# Patient Record
Sex: FEMALE | Race: WHITE | NOT HISPANIC OR LATINO | Employment: OTHER | URBAN - METROPOLITAN AREA
[De-identification: names, ages, dates, MRNs, and addresses within clinical notes are randomized per-mention and may not be internally consistent; named-entity substitution may affect disease eponyms.]

---

## 2024-06-24 ENCOUNTER — OFFICE VISIT (OUTPATIENT)
Dept: URGENT CARE | Facility: URGENT CARE | Age: 74
End: 2024-06-24
Payer: COMMERCIAL

## 2024-06-24 VITALS
SYSTOLIC BLOOD PRESSURE: 122 MMHG | DIASTOLIC BLOOD PRESSURE: 60 MMHG | OXYGEN SATURATION: 95 % | HEART RATE: 80 BPM | RESPIRATION RATE: 16 BRPM | TEMPERATURE: 96.8 F

## 2024-06-24 DIAGNOSIS — R22.0 TONGUE SWELLING: Primary | ICD-10-CM

## 2024-06-24 DIAGNOSIS — K14.0 TONGUE INFLAMMATION: ICD-10-CM

## 2024-06-24 PROCEDURE — 99203 OFFICE O/P NEW LOW 30 MIN: CPT | Performed by: PHYSICIAN ASSISTANT

## 2024-06-24 RX ORDER — CIMETIDINE 300 MG
1 TABLET ORAL 2 TIMES DAILY
COMMUNITY
Start: 2024-06-10

## 2024-06-24 RX ORDER — PANTOPRAZOLE SODIUM 40 MG/1
40 TABLET, DELAYED RELEASE ORAL DAILY
COMMUNITY
Start: 2024-06-10

## 2024-06-24 RX ORDER — FLUTICASONE FUROATE, UMECLIDINIUM BROMIDE AND VILANTEROL TRIFENATATE 100; 62.5; 25 UG/1; UG/1; UG/1
POWDER RESPIRATORY (INHALATION)
COMMUNITY
Start: 2022-09-28

## 2024-06-24 RX ORDER — TRIAMCINOLONE ACETONIDE 0.1 %
PASTE (GRAM) DENTAL 2 TIMES DAILY
Qty: 10 G | Refills: 0 | Status: SHIPPED | OUTPATIENT
Start: 2024-06-24

## 2024-06-24 RX ORDER — OMEPRAZOLE 40 MG/1
1 CAPSULE, DELAYED RELEASE ORAL DAILY
COMMUNITY

## 2024-06-24 RX ORDER — FAMOTIDINE 20 MG/1
TABLET, FILM COATED ORAL
COMMUNITY
Start: 2024-01-09

## 2024-06-24 RX ORDER — SACCHAROMYCES BOULARDII 250 MG
CAPSULE ORAL
COMMUNITY

## 2024-06-24 RX ORDER — HYDROXYZINE PAMOATE 25 MG/1
CAPSULE ORAL
COMMUNITY
Start: 2022-08-17

## 2024-06-24 RX ORDER — CETIRIZINE HYDROCHLORIDE 10 MG/1
10 TABLET ORAL DAILY
Qty: 30 TABLET | Refills: 0 | Status: SHIPPED | OUTPATIENT
Start: 2024-06-24

## 2024-06-24 RX ORDER — MULTIVITAMIN
1 TABLET ORAL DAILY
COMMUNITY

## 2024-06-24 NOTE — PROGRESS NOTES
Assessment & Plan     Tongue swelling    Patient has localized swelling and inflammation left side tongue  Due to swelling and inflammation will start medications:    - cetirizine (ZYRTEC) 10 MG tablet  Dispense: 30 tablet; Refill: 0  - triamcinolone (KENALOG) 0.1 % paste  Dispense: 10 g; Refill: 0  - Adult ENT  Referral    Tongue inflammation    Due to 3 days of having tongue inflammation  Will start medications for tongue swelling or inflammation  Referral to ENT for tongue    - cetirizine (ZYRTEC) 10 MG tablet  Dispense: 30 tablet; Refill: 0  - triamcinolone (KENALOG) 0.1 % paste  Dispense: 10 g; Refill: 0  - Adult ENT  Referral       No follow-ups on file.    Jone Rg, [facility], PA-C  [facility] URGENT CARE [facility]    Anastasiya Ravi is a 73 year old female who presents to clinic today for the following health issues:  Chief Complaint   Patient presents with    Oral Swelling     L side of tongue feel rigid and swollen        HPI  Review of Systems  Constitutional, HEENT, cardiovascular, pulmonary, gi and gu systems are negative, except as otherwise noted.      Objective    /60   Pulse 80   Temp 96.8  F (36  C) (Tympanic)   Resp 16   SpO2 95%   Physical Exam   GENERAL: alert and no distress  EYES: Eyes grossly normal to inspection, PERRL and conjunctivae and sclerae normal  HENT: pos for left side tongue inflammation and swelling  NECK: no adenopathy, no asymmetry, masses, or scars  SKIN: pos for left side swelling of tongue  NEURO: Normal strength and tone, mentation intact and speech normal  PSYCH: mentation appears normal, affect normal/bright      No results found for any visits on 06/24/24.

## 2024-06-26 ENCOUNTER — HOSPITAL ENCOUNTER (EMERGENCY)
Facility: CLINIC | Age: 74
Discharge: HOME | End: 2024-06-26
Attending: EMERGENCY MEDICINE | Admitting: EMERGENCY MEDICINE
Payer: COMMERCIAL

## 2024-06-26 ENCOUNTER — NURSE TRIAGE (OUTPATIENT)
Dept: NURSING | Facility: CLINIC | Age: 74
End: 2024-06-26

## 2024-06-26 VITALS
HEIGHT: 63 IN | WEIGHT: 145 LBS | TEMPERATURE: 98.1 F | SYSTOLIC BLOOD PRESSURE: 117 MMHG | OXYGEN SATURATION: 99 % | BODY MASS INDEX: 25.69 KG/M2 | DIASTOLIC BLOOD PRESSURE: 70 MMHG | HEART RATE: 69 BPM | RESPIRATION RATE: 19 BRPM

## 2024-06-26 DIAGNOSIS — K21.9 GASTROESOPHAGEAL REFLUX DISEASE WITHOUT ESOPHAGITIS: ICD-10-CM

## 2024-06-26 LAB
ALBUMIN SERPL BCG-MCNC: 4.1 G/DL (ref 3.5–5.2)
ALP SERPL-CCNC: 77 U/L (ref 40–150)
ALT SERPL W P-5'-P-CCNC: 13 U/L (ref 0–50)
ANION GAP SERPL CALCULATED.3IONS-SCNC: 9 MMOL/L (ref 7–15)
AST SERPL W P-5'-P-CCNC: 26 U/L (ref 0–45)
ATRIAL RATE - MUSE: 74 BPM
BASOPHILS # BLD AUTO: 0 10E3/UL (ref 0–0.2)
BASOPHILS NFR BLD AUTO: 0 %
BILIRUB SERPL-MCNC: 0.4 MG/DL
BUN SERPL-MCNC: 21 MG/DL (ref 8–23)
CALCIUM SERPL-MCNC: 9.3 MG/DL (ref 8.8–10.2)
CHLORIDE SERPL-SCNC: 104 MMOL/L (ref 98–107)
CREAT SERPL-MCNC: 0.69 MG/DL (ref 0.51–0.95)
DEPRECATED HCO3 PLAS-SCNC: 26 MMOL/L (ref 22–29)
DIASTOLIC BLOOD PRESSURE - MUSE: NORMAL MMHG
EGFRCR SERPLBLD CKD-EPI 2021: >90 ML/MIN/1.73M2
EOSINOPHIL # BLD AUTO: 0.1 10E3/UL (ref 0–0.7)
EOSINOPHIL NFR BLD AUTO: 4 %
ERYTHROCYTE [DISTWIDTH] IN BLOOD BY AUTOMATED COUNT: 13.3 % (ref 10–15)
GLUCOSE SERPL-MCNC: 111 MG/DL (ref 70–99)
HCT VFR BLD AUTO: 38.7 % (ref 35–47)
HGB BLD-MCNC: 12.8 G/DL (ref 11.7–15.7)
IMM GRANULOCYTES # BLD: 0 10E3/UL
IMM GRANULOCYTES NFR BLD: 0 %
INTERPRETATION ECG - MUSE: NORMAL
LYMPHOCYTES # BLD AUTO: 1.2 10E3/UL (ref 0.8–5.3)
LYMPHOCYTES NFR BLD AUTO: 43 %
MCH RBC QN AUTO: 30.9 PG (ref 26.5–33)
MCHC RBC AUTO-ENTMCNC: 33.1 G/DL (ref 31.5–36.5)
MCV RBC AUTO: 94 FL (ref 78–100)
MONOCYTES # BLD AUTO: 0.4 10E3/UL (ref 0–1.3)
MONOCYTES NFR BLD AUTO: 14 %
NEUTROPHILS # BLD AUTO: 1 10E3/UL (ref 1.6–8.3)
NEUTROPHILS NFR BLD AUTO: 39 %
NRBC # BLD AUTO: 0 10E3/UL
NRBC BLD AUTO-RTO: 0 /100
P AXIS - MUSE: 47 DEGREES
PLATELET # BLD AUTO: 231 10E3/UL (ref 150–450)
POTASSIUM SERPL-SCNC: 4 MMOL/L (ref 3.4–5.3)
PR INTERVAL - MUSE: 160 MS
PROT SERPL-MCNC: 7.2 G/DL (ref 6.4–8.3)
QRS DURATION - MUSE: 66 MS
QT - MUSE: 370 MS
QTC - MUSE: 410 MS
R AXIS - MUSE: 56 DEGREES
RBC # BLD AUTO: 4.14 10E6/UL (ref 3.8–5.2)
SODIUM SERPL-SCNC: 139 MMOL/L (ref 135–145)
SYSTOLIC BLOOD PRESSURE - MUSE: NORMAL MMHG
T AXIS - MUSE: -6 DEGREES
TROPONIN T SERPL HS-MCNC: <6 NG/L
VENTRICULAR RATE- MUSE: 74 BPM
WBC # BLD AUTO: 2.7 10E3/UL (ref 4–11)

## 2024-06-26 PROCEDURE — 93010 ELECTROCARDIOGRAM REPORT: CPT | Performed by: EMERGENCY MEDICINE

## 2024-06-26 PROCEDURE — 99284 EMERGENCY DEPT VISIT MOD MDM: CPT | Performed by: EMERGENCY MEDICINE

## 2024-06-26 PROCEDURE — 36415 COLL VENOUS BLD VENIPUNCTURE: CPT | Performed by: EMERGENCY MEDICINE

## 2024-06-26 PROCEDURE — 84484 ASSAY OF TROPONIN QUANT: CPT | Performed by: EMERGENCY MEDICINE

## 2024-06-26 PROCEDURE — 80053 COMPREHEN METABOLIC PANEL: CPT | Performed by: EMERGENCY MEDICINE

## 2024-06-26 PROCEDURE — 250N000013 HC RX MED GY IP 250 OP 250 PS 637: Performed by: EMERGENCY MEDICINE

## 2024-06-26 PROCEDURE — 85025 COMPLETE CBC W/AUTO DIFF WBC: CPT | Performed by: EMERGENCY MEDICINE

## 2024-06-26 PROCEDURE — 93005 ELECTROCARDIOGRAM TRACING: CPT | Performed by: EMERGENCY MEDICINE

## 2024-06-26 PROCEDURE — 250N000009 HC RX 250: Performed by: EMERGENCY MEDICINE

## 2024-06-26 RX ORDER — LIDOCAINE HYDROCHLORIDE 20 MG/ML
10 SOLUTION OROPHARYNGEAL ONCE
Status: COMPLETED | OUTPATIENT
Start: 2024-06-26 | End: 2024-06-26

## 2024-06-26 RX ORDER — MAGNESIUM HYDROXIDE/ALUMINUM HYDROXICE/SIMETHICONE 120; 1200; 1200 MG/30ML; MG/30ML; MG/30ML
15 SUSPENSION ORAL ONCE
Status: COMPLETED | OUTPATIENT
Start: 2024-06-26 | End: 2024-06-26

## 2024-06-26 RX ADMIN — LIDOCAINE HYDROCHLORIDE 10 ML: 20 SOLUTION OROPHARYNGEAL at 07:32

## 2024-06-26 RX ADMIN — ALUMINUM HYDROXIDE, MAGNESIUM HYDROXIDE, AND SIMETHICONE 15 ML: 1200; 120; 1200 SUSPENSION ORAL at 07:32

## 2024-06-26 ASSESSMENT — ACTIVITIES OF DAILY LIVING (ADL)
ADLS_ACUITY_SCORE: 35

## 2024-06-26 ASSESSMENT — COLUMBIA-SUICIDE SEVERITY RATING SCALE - C-SSRS
6. HAVE YOU EVER DONE ANYTHING, STARTED TO DO ANYTHING, OR PREPARED TO DO ANYTHING TO END YOUR LIFE?: NO
2. HAVE YOU ACTUALLY HAD ANY THOUGHTS OF KILLING YOURSELF IN THE PAST MONTH?: NO
1. IN THE PAST MONTH, HAVE YOU WISHED YOU WERE DEAD OR WISHED YOU COULD GO TO SLEEP AND NOT WAKE UP?: NO

## 2024-06-26 NOTE — ED PROVIDER NOTES
ED PROVIDER NOTE  June 26, 2024  History     Chief Complaint   Patient presents with    Shortness of Breath    Heartburn     HPI  Breonna Chapman is a 73 year old female who arrived today to the emergency department from [place] for medical evaluation emergency department for ongoing gastroesophageal reflux.  She states he was diagnosed with this approximately a year ago.  She has been intermittently in the emergency department.  She reports frustration that she has intolerant to PPIs and H2 blockers.  She has never had a gastroenterologist evaluate her.  She states she was referred to a [facility] but was dissatisfied.  The patient states she typically travels and when she finds a good healthcare provider in the region she will fly back to that region and thus is in [place] secondary to good health care.  She has had recent evaluation for cardiac workup in the emergency room including troponins and EKGs demonstrate no sign of obvious cardiac disease.  She reports no known CAD.  She reports primary reflux at night described as burning sensation with taste change.  No shortness of breath, fever, chills difficulty breathing or difficulty swallowing.  Patient reports he is presently here for a GI cocktail with request of GI follow-up.      Past Medical History  No past medical history on file.  No past surgical history on file.  calcium carbonate-vitamin D (OSCAL) 500-5 MG-MCG tablet  cetirizine (ZYRTEC) 10 MG tablet  cholecalciferol (VITAMIN D3) 25 mcg (1000 units) capsule  cimetidine (TAGAMET) 300 MG tablet  famotidine (PEPCID) 20 MG tablet  Fluticasone-Umeclidin-Vilant (TRELEGY ELLIPTA) 100-62.5-25 MCG/ACT oral inhaler  hydrOXYzine sunny (VISTARIL) 25 MG capsule  Multiple Vitamin (DAILY VITES) TABS  omeprazole (PRILOSEC) 40 MG DR capsule  pantoprazole (PROTONIX) 40 MG EC tablet  Probiotic Product (PROBIOTIC-10) CHEW  saccharomyces boulardii (FLORASTOR) 250 MG capsule  triamcinolone (KENALOG) 0.1 % paste      Allergies    Allergen Reactions    Cephalexin Hives and Rash     Other Reaction(s): Not available, Rash with itching (itching rash, pruritus)    Tolerates Ancef    Other Reaction(s): Not available, Rash with itching (itching rash, pruritus)      Tolerates Ancef      Tolerates Ancef Tolerates Ancef    Tolerates Ancef   Tolerates Ancef    Ciprofloxacin Nausea and Vomiting and Palpitations     Other Reaction(s): *Unknown    Heart palpitations    Doesn't think she is allergic    Other Reaction(s): Not available      Heart palpitations    Fluconazole Anaphylaxis, Itching, Rash, Shortness Of Breath, Swelling and Unknown     Other Reaction(s): Fever, Not available, Rash (Non-Urticarial Bumps), Rash with itching (itching rash, pruritus)    Other reaction(s): Rash (Non-Urticarial Bumps)   Other reaction(s): Fever    Other reaction(s): Fever    Other Reaction(s): Not available, Rash (Non-Urticarial Bumps), Rash with itching (itching rash, pruritus), Unknown, Unknown      Other reaction(s): Rash (Non-Urticarial Bumps) Other reaction(s): Fever Other reaction(s): Fever      Other reaction(s): Rash (Non-Urticarial Bumps) Other reaction(s): Fever      Other reaction(s): Fever    Other reaction(s): Rash (Non-Urticarial Bumps)   Other reaction(s): Fever   Other reaction(s): Fever    Montelukast Anaphylaxis, Unknown, Other (See Comments), Rash and Shortness Of Breath     Felt like she couldn't breathe    Other Reaction(s): Not available    Omeprazole Dermatitis, Itching and Rash     Other Reaction(s): Rash without itching      Drug-induced lupus, confirmed with dermatology skin biopsy    Drug-induced lupus, confirmed with dermatology skin biopsy    Mupirocin Itching    Penicillins Rash     Other Reaction(s): Rash (Non-Urticarial Bumps), Rash with itching (itching rash, pruritus)    Other reaction(s): Rash (Non-Urticarial Bumps)    Other Reaction(s): Rash (Non-Urticarial Bumps), Rash with itching (itching rash, pruritus)      Other reaction(s):  "Rash (Non-Urticarial Bumps)    Mold      Other Reaction(s): Other (See Comments)    Patient reported    Molds & Smuts      Other Reaction(s): Other (See Comments)      Patient reported    Pneumococcal Polysaccharides Conjugated Vaccine Unknown     Other Reaction(s): Unknown    Sulfasalazine Other (See Comments)     Patient doesn't remember reaction. Patient doesn't remember reaction.      Patient doesn't remember reaction.    Azithromycin Palpitations, Rash and Unknown     Other Reaction(s): Not available, Rash (Non-Urticarial Bumps), Rash with itching (itching rash, pruritus)    \"was taking with flucanozale and think it was mainly fluconazole but unsure if azithromycin\"    Other Reaction(s): Not available, Rash (Non-Urticarial Bumps), Rash with itching (itching rash, pruritus), Unknown      \"was taking with flucanozale and think it was mainly fluconazole but unsure if azithromycin\"    Doxycycline Muscle Pain (Myalgia)    Famotidine Rash    Fluorescein-Proparacaine Rash    Orphenadrine Rash    Sulfa Antibiotics Other (See Comments), Rash and Unknown     Other Reaction(s): Unknown     Family History  No family history on file.  Social History          A medically appropriate review of systems was performed with pertinent positives and negatives noted in the HPI, and all other systems negative.      Physical Exam   BP: 117/70  Pulse: 69  Temp: 98.1  F (36.7  C)  Resp: 19  Height: 160 cm (5' 3\")  Weight: 65.8 kg (145 lb)  SpO2: 99 %      Physical Exam  Vitals and nursing note reviewed.   Constitutional:       General: She is not in acute distress.     Appearance: Normal appearance. She is not ill-appearing, toxic-appearing or diaphoretic.   HENT:      Head: Normocephalic and atraumatic.      Right Ear: External ear normal.      Left Ear: External ear normal.      Nose: Nose normal. No congestion.      Mouth/Throat:      Mouth: Mucous membranes are moist.      Pharynx: Oropharynx is clear. No oropharyngeal exudate. "   Eyes:      Extraocular Movements: Extraocular movements intact.      Conjunctiva/sclera: Conjunctivae normal.      Pupils: Pupils are equal, round, and reactive to light.   Cardiovascular:      Rate and Rhythm: Normal rate.      Pulses: Normal pulses.      Heart sounds: Normal heart sounds. No murmur heard.     No friction rub.   Pulmonary:      Effort: Pulmonary effort is normal. No respiratory distress.      Breath sounds: No stridor. No wheezing or rales.   Abdominal:      General: Abdomen is flat.   Musculoskeletal:      Cervical back: Normal range of motion.   Skin:     General: Skin is warm.      Capillary Refill: Capillary refill takes less than 2 seconds.      Coloration: Skin is not pale.      Findings: No bruising or erythema.   Neurological:      General: No focal deficit present.      Mental Status: She is alert.      Cranial Nerves: No cranial nerve deficit.      Motor: No weakness.   Psychiatric:         Mood and Affect: Mood normal.         Behavior: Behavior normal.         ED Course        Procedures         Results for orders placed or performed during the hospital encounter of 06/26/24 (from the past 24 hour(s))   EKG 12 lead   Result Value Ref Range    Systolic Blood Pressure  mmHg    Diastolic Blood Pressure  mmHg    Ventricular Rate 74 BPM    Atrial Rate 74 BPM    MN Interval 160 ms    QRS Duration 66 ms     ms    QTc 410 ms    P Axis 47 degrees    R AXIS 56 degrees    T Axis -6 degrees    Interpretation ECG       Sinus rhythm  Low voltage QRS  Nonspecific T wave abnormality  Abnormal ECG     CBC with platelets differential    Narrative    The following orders were created for panel order CBC with platelets differential.  Procedure                               Abnormality         Status                     ---------                               -----------         ------                     CBC with platelets and d...[[id number]]  Abnormal            Final result                 Please  view results for these tests on the individual orders.   Troponin T, High Sensitivity   Result Value Ref Range    Troponin T, High Sensitivity <6 <=14 ng/L   Comprehensive metabolic panel   Result Value Ref Range    Sodium 139 135 - 145 mmol/L    Potassium 4.0 3.4 - 5.3 mmol/L    Carbon Dioxide (CO2) 26 22 - 29 mmol/L    Anion Gap 9 7 - 15 mmol/L    Urea Nitrogen 21.0 8.0 - 23.0 mg/dL    Creatinine 0.69 0.51 - 0.95 mg/dL    GFR Estimate >90 >60 mL/min/1.73m2    Calcium 9.3 8.8 - 10.2 mg/dL    Chloride 104 98 - 107 mmol/L    Glucose 111 (H) 70 - 99 mg/dL    Alkaline Phosphatase 77 40 - 150 U/L    AST 26 0 - 45 U/L    ALT 13 0 - 50 U/L    Protein Total 7.2 6.4 - 8.3 g/dL    Albumin 4.1 3.5 - 5.2 g/dL    Bilirubin Total 0.4 <=1.2 mg/dL   CBC with platelets and differential   Result Value Ref Range    WBC Count 2.7 (L) 4.0 - 11.0 10e3/uL    RBC Count 4.14 3.80 - 5.20 10e6/uL    Hemoglobin 12.8 11.7 - 15.7 g/dL    Hematocrit 38.7 35.0 - 47.0 %    MCV 94 78 - 100 fL    MCH 30.9 26.5 - 33.0 pg    MCHC 33.1 31.5 - 36.5 g/dL    RDW 13.3 10.0 - 15.0 %    Platelet Count 231 150 - 450 10e3/uL    % Neutrophils 39 %    % Lymphocytes 43 %    % Monocytes 14 %    % Eosinophils 4 %    % Basophils 0 %    % Immature Granulocytes 0 %    NRBCs per 100 WBC 0 <1 /100    Absolute Neutrophils 1.0 (L) 1.6 - 8.3 10e3/uL    Absolute Lymphocytes 1.2 0.8 - 5.3 10e3/uL    Absolute Monocytes 0.4 0.0 - 1.3 10e3/uL    Absolute Eosinophils 0.1 0.0 - 0.7 10e3/uL    Absolute Basophils 0.0 0.0 - 0.2 10e3/uL    Absolute Immature Granulocytes 0.0 <=0.4 10e3/uL    Absolute NRBCs 0.0 10e3/uL     Medications   alum & mag hydroxide-simethicone (MAALOX) suspension 15 mL (15 mLs Oral $Given 6/26/24 0732)   lidocaine (viscous) (XYLOCAINE) 2 % solution 10 mL (10 mLs Mouth/Throat $Given 6/26/24 0732)             Critical care was not performed.     Medical Decision Making  The patient's presentation was of moderate complexity (an acute complicated injury).    The  patient's evaluation involved:  review of external note(s) from 3+ sources (see separate area of note for details)  review of 3+ test result(s) ordered prior to this encounter (see separate area of note for details)  ordering and/or review of 3+ test(s) in this encounter (see separate area of note for details)    The patient's management necessitated moderate risk (prescription drug management including medications given in the ED).    Assessments & Plan (with Medical Decision Making)     Breonna Chapman is a 73 year old female who arrived today to the emergency department from [place] for medical evaluation emergency department for ongoing gastroesophageal reflux.  Upon arrival patient noted alert.  Presently afebrile and hemodynamically stable.  She is seated upright in a chair and appears to be nontoxic.  She is speaking in full sentence without signs of increased work of breathing.  Low suspicion for primary thoracic disease such as pneumothorax, pneumonia, PE, effusion.  She seems to be talking without difficulty in swallowing.  Low suspicion for large esophageal mass or hiatal hernia warranting CT imaging.  She does not have any hemoptysis or hematemesis.  I do not believe she benefit from stat CT imaging of the chest.  She does describe symptoms consistent with refractory reflux.  She would likely benefit from GI follow-up which I will place electronically.  I did set realistic limitations from emergent management for chronic reflux as well as timing for consideration of gastroenterology follow-up.  I have a low suspicion for CAD, pericarditis, myocarditis, pericardial effusion but did obtain troponins EKG.  EKG in my interpretation demonstrating a sinus rhythm and normal rate.  No ST changes, PVCs or interval abnormality.  I would plan for single troponin with comparison to that performed in [place] if nonelevated hold on further cardiac workup emergently.    Laboratory studies overall reassuring with no  significant leukocytosis, anemia or electrolyte abnormality.  Troponin nonelevated.  I do not believe requires emergent imaging at this time.    I did further discuss patient's history.  She states she has actually been to [facility] and now presenting to [place] for workup of continued reflux.  I did further clarify she was evaluated by gastroenterology at one of the above facilities.  She does confirm that she has not had EGD.  I suspect at some point she would benefit from this however not on an emergent basis.  Again as per above I did set realistic limitations with presenting to emergency department for gastroenterology consultation.  I did offer to connect her virtually which makes mediate an outpatient consultation however she states per [place] law she cannot have a virtual visit electronically unless that provider is in state.  As such I have requested as per patient's request an outpatient follow-up with gastroenterology.  I did discuss that certainly were happy to reevaluate her in the emergency department at any time should she have change, progression or worsening of symptoms.  I have strongly encouraged her to work with a primary care physician to establish ongoing care with a gastroenterologist locally or to work towards scheduling a direct follow-up.  We did discuss complexity including potential other medications, local injection, potential biopsy, indications for EGD as well as some patients potentially requiring surgical intervention for tightening of sphincter.  I did stress that most of these are nonemergent causes and a nondiagnostic form simply cited some potential interventions patients get.    I have reviewed the nursing notes.    I have reviewed the findings, diagnosis, plan and need for follow up with the patient.    New Prescriptions    No medications on file       Final diagnoses:   Gastroesophageal reflux disease without esophagitis       STEVEN WEBB  MD    6/26/2024   [facility] EMERGENCY DEPARTMENT     Gus Farfan MD  06/26/24 0844

## 2024-06-26 NOTE — DISCHARGE INSTRUCTIONS
I was happy to see that you have had some relief after the GI cocktail in emergency department.  Sounds quite frustrating the amount of follow-up and attempt to achieve specialty evaluation the past.  I have requested a follow-up with her gastroenterology service electronically.  I would anticipate a call from the gastroenterology .  As we discussed in person some specialties including gastroenterology can be quite sometime before actual follow-up happens in person.  We did discuss the potential option for virtual visit however as per your understanding of the [place] law this is not viable.  I would continue to work with your primary care physician for assistance with scheduling in advance optimally saving time from presenting to the emergency department.  As we discussed on my end it is difficult to request an emergent follow-up and this is fairly consistent most places across the country.  Should you notice a change, progression or worsening symptoms such as difficulty swallowing, inability to swallow, severe abdominal pain, blood within your stool, high fevers or any other concern please feel free to call or return emergently at any time.

## 2024-06-26 NOTE — CONSULTS
Care Management Discharge Note    Discharge Date:         Discharge Disposition:  Not assessed    Discharge Services:  Not assessed    Discharge DME:  Not assessed    Discharge Transportation:  Not assessed    Private pay costs discussed: Not applicable    Does the patient's insurance plan have a 3 day qualifying hospital stay waiver?  No    PAS Confirmation Code:  N/A  Patient/family educated on Medicare website which has current facility and service quality ratings:  Not assessed    Education Provided on the Discharge Plan:  Yes  Persons Notified of Discharge Plans: N/A  Patient/Family in Agreement with the Plan:      Handoff Referral Completed: No    Additional Information:  Rafaelr received [facility] consult. Pt was discharged to the [facility], but has questions about if she can scheduled OP follow up care here--pt is from [place]. Pt reportedly traveling out of state because she has been unsatisfied with the care in her home state and has had good care here previously.    Rafaelr met with pt in the [facility]. Pt reports she is trying to schedule an endoscope appt but they are booked out until October per pt's report. Pt requested that writer schedule her an appointment, which writer explained he would be unable to do. Rafaelr worked with pt on identifying various health systems she could contact in the area and also recommended [facility] in [place]. Pt also asked about short term housing for seniors. Writer explained there wasn't any low income short term housing for seniors that  was aware of at this time.     No further needs identified at this time. Pt will be discharging from the [facility].    ________________    MICHAEL Woo, [facility]  ED/Observation   [facility]  Phone: [phone]  Fax: [phone]    After hours [employer] and After Hours [employer]  Available from 4:00pm - Midnight

## 2024-06-27 NOTE — TELEPHONE ENCOUNTER
Nurse Triage SBAR    Situation: Calling about EKG test result from ER.     Background:   -Patient calling  -It is okay to call back and leave a detailed message at this number:       Assessment: Pt had ER visit today and is concerned about her EKG result. She is from out of state and visiting locally.She stated that the ER nurse gave her the AVS at the end of her visit and told her that all of her test results were good, but pt is wondering what the EKG interpretation means.  No triage.     Advised to call the nurse line for the ER and was given the phone number. Unsure if kristen will be able to answer her questions.  Otherwise follow up with her pcp or other healthcare provider in clinic.   Reason for Disposition    [1] Follow-up call to recent contact AND [2] information only call, no triage required    Protocols used: Information Only Call - No Triage-A-

## 2024-06-28 VITALS
HEART RATE: 64 BPM | RESPIRATION RATE: 16 BRPM | TEMPERATURE: 98 F | WEIGHT: 145 LBS | SYSTOLIC BLOOD PRESSURE: 147 MMHG | OXYGEN SATURATION: 99 % | DIASTOLIC BLOOD PRESSURE: 56 MMHG | BODY MASS INDEX: 25.69 KG/M2

## 2024-06-28 PROCEDURE — 93005 ELECTROCARDIOGRAM TRACING: CPT

## 2024-06-28 PROCEDURE — 99284 EMERGENCY DEPT VISIT MOD MDM: CPT

## 2024-06-29 ENCOUNTER — HOSPITAL ENCOUNTER (EMERGENCY)
Facility: CLINIC | Age: 74
Discharge: HOME | End: 2024-06-29
Attending: EMERGENCY MEDICINE | Admitting: EMERGENCY MEDICINE
Payer: COMMERCIAL

## 2024-06-29 DIAGNOSIS — R00.2 PALPITATIONS: ICD-10-CM

## 2024-06-29 DIAGNOSIS — M54.2 NECK PAIN: ICD-10-CM

## 2024-06-29 DIAGNOSIS — K21.00 GASTROESOPHAGEAL REFLUX DISEASE WITH ESOPHAGITIS WITHOUT HEMORRHAGE: ICD-10-CM

## 2024-06-29 LAB
ACANTHOCYTES BLD QL SMEAR: NORMAL
ANION GAP SERPL CALCULATED.3IONS-SCNC: 9 MMOL/L (ref 7–15)
ATRIAL RATE - MUSE: 62 BPM
AUER BODIES BLD QL SMEAR: NORMAL
BASO STIPL BLD QL SMEAR: NORMAL
BASOPHILS # BLD AUTO: 0 10E3/UL (ref 0–0.2)
BASOPHILS NFR BLD AUTO: 1 %
BITE CELLS BLD QL SMEAR: NORMAL
BLISTER CELLS BLD QL SMEAR: NORMAL
BUN SERPL-MCNC: 22.1 MG/DL (ref 8–23)
BURR CELLS BLD QL SMEAR: NORMAL
CALCIUM SERPL-MCNC: 9.2 MG/DL (ref 8.8–10.2)
CHLORIDE SERPL-SCNC: 103 MMOL/L (ref 98–107)
CREAT SERPL-MCNC: 0.75 MG/DL (ref 0.51–0.95)
DACRYOCYTES BLD QL SMEAR: NORMAL
DEPRECATED HCO3 PLAS-SCNC: 27 MMOL/L (ref 22–29)
DIASTOLIC BLOOD PRESSURE - MUSE: NORMAL MMHG
EGFRCR SERPLBLD CKD-EPI 2021: 84 ML/MIN/1.73M2
ELLIPTOCYTES BLD QL SMEAR: NORMAL
EOSINOPHIL # BLD AUTO: 0.1 10E3/UL (ref 0–0.7)
EOSINOPHIL NFR BLD AUTO: 3 %
ERYTHROCYTE [DISTWIDTH] IN BLOOD BY AUTOMATED COUNT: 13 % (ref 10–15)
FRAGMENTS BLD QL SMEAR: NORMAL
GIANT PLATELETS BLD QL SMEAR: NORMAL
GLUCOSE SERPL-MCNC: 107 MG/DL (ref 70–99)
HCT VFR BLD AUTO: 38.5 % (ref 35–47)
HGB BLD-MCNC: 13.1 G/DL (ref 11.7–15.7)
HGB C CRYSTALS: NORMAL
HOWELL-JOLLY BOD BLD QL SMEAR: NORMAL
IMM GRANULOCYTES # BLD: 0 10E3/UL
IMM GRANULOCYTES NFR BLD: 0 %
INTERPRETATION ECG - MUSE: NORMAL
LYMPHOCYTES # BLD AUTO: 1.9 10E3/UL (ref 0.8–5.3)
LYMPHOCYTES NFR BLD AUTO: 51 %
MCH RBC QN AUTO: 31.4 PG (ref 26.5–33)
MCHC RBC AUTO-ENTMCNC: 34 G/DL (ref 31.5–36.5)
MCV RBC AUTO: 92 FL (ref 78–100)
MONOCYTES # BLD AUTO: 0.5 10E3/UL (ref 0–1.3)
MONOCYTES NFR BLD AUTO: 12 %
NEUTROPHILS # BLD AUTO: 1.2 10E3/UL (ref 1.6–8.3)
NEUTROPHILS NFR BLD AUTO: 33 %
NEUTS HYPERSEG BLD QL SMEAR: NORMAL
NRBC # BLD AUTO: 0 10E3/UL
NRBC BLD AUTO-RTO: 0 /100
P AXIS - MUSE: 31 DEGREES
PATH REV: NORMAL
PLAT MORPH BLD: NORMAL
PLATELET # BLD AUTO: 223 10E3/UL (ref 150–450)
POLYCHROMASIA BLD QL SMEAR: NORMAL
POTASSIUM SERPL-SCNC: 4 MMOL/L (ref 3.4–5.3)
PR INTERVAL - MUSE: 166 MS
QRS DURATION - MUSE: 76 MS
QT - MUSE: 408 MS
QTC - MUSE: 414 MS
R AXIS - MUSE: 52 DEGREES
RBC # BLD AUTO: 4.17 10E6/UL (ref 3.8–5.2)
RBC AGGLUT BLD QL: NORMAL
RBC MORPH BLD: NORMAL
ROULEAUX BLD QL SMEAR: NORMAL
SICKLE CELLS BLD QL SMEAR: NORMAL
SMUDGE CELLS BLD QL SMEAR: NORMAL
SODIUM SERPL-SCNC: 139 MMOL/L (ref 135–145)
SPHEROCYTES BLD QL SMEAR: NORMAL
STOMATOCYTES BLD QL SMEAR: NORMAL
SYSTOLIC BLOOD PRESSURE - MUSE: NORMAL MMHG
T AXIS - MUSE: 36 DEGREES
TARGETS BLD QL SMEAR: NORMAL
TOXIC GRANULES BLD QL SMEAR: NORMAL
TROPONIN T SERPL HS-MCNC: 6 NG/L
VARIANT LYMPHS BLD QL SMEAR: NORMAL
VENTRICULAR RATE- MUSE: 62 BPM
WBC # BLD AUTO: 3.7 10E3/UL (ref 4–11)

## 2024-06-29 PROCEDURE — 84484 ASSAY OF TROPONIN QUANT: CPT | Performed by: EMERGENCY MEDICINE

## 2024-06-29 PROCEDURE — 85025 COMPLETE CBC W/AUTO DIFF WBC: CPT | Performed by: EMERGENCY MEDICINE

## 2024-06-29 PROCEDURE — 36415 COLL VENOUS BLD VENIPUNCTURE: CPT | Performed by: EMERGENCY MEDICINE

## 2024-06-29 PROCEDURE — 80048 BASIC METABOLIC PNL TOTAL CA: CPT | Performed by: EMERGENCY MEDICINE

## 2024-06-29 ASSESSMENT — COLUMBIA-SUICIDE SEVERITY RATING SCALE - C-SSRS
1. IN THE PAST MONTH, HAVE YOU WISHED YOU WERE DEAD OR WISHED YOU COULD GO TO SLEEP AND NOT WAKE UP?: NO
6. HAVE YOU EVER DONE ANYTHING, STARTED TO DO ANYTHING, OR PREPARED TO DO ANYTHING TO END YOUR LIFE?: NO
2. HAVE YOU ACTUALLY HAD ANY THOUGHTS OF KILLING YOURSELF IN THE PAST MONTH?: NO

## 2024-06-29 ASSESSMENT — ACTIVITIES OF DAILY LIVING (ADL)
ADLS_ACUITY_SCORE: 33

## 2024-06-29 NOTE — ED PROVIDER NOTES
Emergency Department Note      History of Present Illness     Chief Complaint   Palpitations      HPI   Beronna Chapman is a 73 year old female who presented to the emergency department with palpitations and neck pain.  Patient reports brief palpitations that are now resolved.  Also reports posterior neck, posterior upper thoracic back pain that is also now resolved.  She is unclear if she has been too active in the last few days.  She reports a history of acid reflux.  Denies any chest pain or shortness of breath.  No fever or vomiting.    Independent Historian   None    Review of External Notes   Reviewed emergency department note and visit from June 26, 2024 where she was seen for GERD.  Reviewed emergency department visit from June 24 where she was seen for neck pain, GERD    Past Medical History     Medical History and Problem List   No past medical history on file.    Medications   calcium carbonate-vitamin D (OSCAL) 500-5 MG-MCG tablet  cetirizine (ZYRTEC) 10 MG tablet  cholecalciferol (VITAMIN D3) 25 mcg (1000 units) capsule  cimetidine (TAGAMET) 300 MG tablet  famotidine (PEPCID) 20 MG tablet  Fluticasone-Umeclidin-Vilant (TRELEGY ELLIPTA) 100-62.5-25 MCG/ACT oral inhaler  hydrOXYzine sunny (VISTARIL) 25 MG capsule  Multiple Vitamin (DAILY VITES) TABS  omeprazole (PRILOSEC) 40 MG DR capsule  pantoprazole (PROTONIX) 40 MG EC tablet  Probiotic Product (PROBIOTIC-10) CHEW  saccharomyces boulardii (FLORASTOR) 250 MG capsule  triamcinolone (KENALOG) 0.1 % paste      Surgical History   No recent surgeries  Physical Exam     Patient Vitals for the past 24 hrs:   BP Temp Temp src Pulse Resp SpO2 Weight   06/28/24 2358 (!) 147/56 98  F (36.7  C) Oral 64 16 99 % 65.8 kg (145 lb)     Physical Exam  General: Sitting up in bed  Eyes:  The pupils are equal and round    Conjunctivae and sclerae are normal  ENT:    Oropharynx clear with no erythema  Neck:  Normal range of motion  CV:  Regular rate, regular rhythm     Skin  warm and well perfused   Resp:  Non labored breathing on room air    No tachypnea    No cough heard    Lungs clear bilaterally  GI:  Abdomen is soft, there is no rigidity    No distension    No rebound tenderness     No abdominal tenderness  MS:  Nontender neck and upper back, no neck stiffness  Skin:  No rash or acute skin lesions noted  Neuro:   Awake, alert.      Speech is normal and fluent.    Face is symmetric.     Moves all extremities equally  Psych: Normal affect.  Appropriate interactions.    Diagnostics     Lab Results   Labs Ordered and Resulted from Time of ED Arrival to Time of ED Departure   BASIC METABOLIC PANEL - Abnormal       Result Value    Sodium 139      Potassium 4.0      Chloride 103      Carbon Dioxide (CO2) 27      Anion Gap 9      Urea Nitrogen 22.1      Creatinine 0.75      GFR Estimate 84      Calcium 9.2      Glucose 107 (*)    CBC WITH PLATELETS AND DIFFERENTIAL - Abnormal    WBC Count 3.7 (*)     RBC Count 4.17      Hemoglobin 13.1      Hematocrit 38.5      MCV 92      MCH 31.4      MCHC 34.0      RDW 13.0      Platelet Count 223      % Neutrophils 33      % Lymphocytes 51      % Monocytes 12      % Eosinophils 3      % Basophils 1      % Immature Granulocytes 0      NRBCs per 100 WBC 0      Absolute Neutrophils 1.2 (*)     Absolute Lymphocytes 1.9      Absolute Monocytes 0.5      Absolute Eosinophils 0.1      Absolute Basophils 0.0      Absolute Immature Granulocytes 0.0      Absolute NRBCs 0.0     TROPONIN T, HIGH SENSITIVITY - Normal    Troponin T, High Sensitivity 6     RBC AND PLATELET MORPHOLOGY    RBC Morphology Confirmed RBC Indices      Platelet Assessment        Value: Automated Count Confirmed. Platelet morphology is normal.    Giant Platelets        Acanthocytes        Ulisses Rods        Basophilic Stippling        Bite Cells        Blister Cells        Jonathan Cells        Elliptocytes        Hgb C Crystals        Calvo-Jolly Bodies        Hypersegmented Neutrophils         Polychromasia        RBC agglutination        RBC Fragments        Reactive Lymphocytes        Rouleaux        Sickle Cells        Smudge Cells        Spherocytes        Stomatocytes        Target Cells        Teardrop Cells        Toxic Neutrophils        Pathologist Review Comments (Blood)         EKG   ECG results from 06/26/24   EKG 12 lead     Value    Systolic Blood Pressure     Diastolic Blood Pressure     Ventricular Rate 74    Atrial Rate 74    NJ Interval 160    QRS Duration 66        QTc 410    P Axis 47    R AXIS 56    T Axis -6    Interpretation ECG      Sinus rhythm  Low voltage QRS  Nonspecific T wave abnormality  Abnormal ECG  Unconfirmed report - interpretation of this ECG is computer generated - see medical record for final interpretation  Confirmed by - EMERGENCY ROOM, PHYSICIAN ([id number]),  DEVAN SHEETS ([id number]) on 6/26/2024 8:05:05 PM     EKG 12-lead, tracing only     Value    Systolic Blood Pressure     Diastolic Blood Pressure     Ventricular Rate 62    Atrial Rate 62    NJ Interval 166    QRS Duration 76        QTc 414    P Axis 31    R AXIS 52    T Axis 36    Interpretation ECG Sinus rhythm  Low voltage QRS  Borderline ECG        ED Course      ED Course   Past medical records, nursing notes, and vitals reviewed.      Medical Decision Making / Diagnosis     MACIE Chapman is a 73 year old female who presented to the emergency department with neck pain and palpitations.  EKG shows sinus rhythm.  EKG appears similar to prior EKG.  Her symptoms are resolved that she had earlier.  Oropharynx is normal.  No neck stiffness to suggest meningitis.  No falls to suggest fracture.  I see that she was seen in the emergency department on June 24 for neck pain as well.  She feels that her symptoms today are different than her GERD though I am suspicious that this is contributing.  With her many visits for similar concerns, there may be a component of anxiety as well.  Troponin is  normal.  I doubt ACS and do not think further testing for this is indicated.  Doubt PE or dissection.  No indication for emergent GI consult.  She was given referral to [facility] already and also has been in contact with [facility] for an appointment.  I also gave her the information for [facility].  At this time, there is no indication for hospitalization or emergent procedure.    Disposition   The patient was discharged.     Diagnosis     ICD-10-CM    1. Palpitations  R00.2       2. Neck pain  M54.2       3. Gastroesophageal reflux disease with esophagitis without hemorrhage  K21.00            MD Iraida East, Ananya Dodson MD  06/29/24 0723

## 2024-06-29 NOTE — ED TRIAGE NOTES
Pt c/o heart palpations and neck pain starting tonight. Pt denies cardiac hx. Pt does state that she has hx of GERD      Triage Assessment (Adult)       Row Name 06/29/24 0000          Triage Assessment    Airway WDL WDL        Respiratory WDL    Respiratory WDL WDL        Skin Circulation/Temperature WDL    Skin Circulation/Temperature WDL WDL        Cardiac WDL    Cardiac WDL WDL        Peripheral/Neurovascular WDL    Peripheral Neurovascular WDL WDL        Cognitive/Neuro/Behavioral WDL    Cognitive/Neuro/Behavioral WDL WDL

## 2024-07-01 ENCOUNTER — TELEPHONE (OUTPATIENT)
Dept: GASTROENTEROLOGY | Facility: CLINIC | Age: 74
End: 2024-07-01
Payer: COMMERCIAL

## 2024-07-01 NOTE — TELEPHONE ENCOUNTER
[facility] Call Center    Phone Message    May a detailed message be left on voicemail: Yes    Reason for Call: Other: Patient is currently scheduled on 9/10, as visit type New GI Urgent. This is outside the expected timeline for this referral. Patient has been added to the waitlist.      NO PH, No video ([place])     Action Taken: Message routed to:  Other: GI REFERRAL TRIAGE POOL     Travel Screening: Not Applicable

## 2024-07-12 ENCOUNTER — APPOINTMENT (OUTPATIENT)
Dept: GENERAL RADIOLOGY | Facility: CLINIC | Age: 74
End: 2024-07-12
Attending: EMERGENCY MEDICINE
Payer: COMMERCIAL

## 2024-07-12 ENCOUNTER — HOSPITAL ENCOUNTER (EMERGENCY)
Facility: CLINIC | Age: 74
Discharge: HOME | End: 2024-07-12
Attending: EMERGENCY MEDICINE | Admitting: EMERGENCY MEDICINE
Payer: COMMERCIAL

## 2024-07-12 ENCOUNTER — APPOINTMENT (OUTPATIENT)
Dept: ULTRASOUND IMAGING | Facility: CLINIC | Age: 74
End: 2024-07-12
Attending: EMERGENCY MEDICINE
Payer: COMMERCIAL

## 2024-07-12 VITALS
BODY MASS INDEX: 25.69 KG/M2 | DIASTOLIC BLOOD PRESSURE: 59 MMHG | SYSTOLIC BLOOD PRESSURE: 133 MMHG | TEMPERATURE: 97.5 F | HEIGHT: 63 IN | RESPIRATION RATE: 19 BRPM | HEART RATE: 65 BPM | WEIGHT: 145 LBS | OXYGEN SATURATION: 93 %

## 2024-07-12 DIAGNOSIS — K21.9 GASTROESOPHAGEAL REFLUX DISEASE, UNSPECIFIED WHETHER ESOPHAGITIS PRESENT: ICD-10-CM

## 2024-07-12 DIAGNOSIS — R74.8 ELEVATED LIPASE: ICD-10-CM

## 2024-07-12 DIAGNOSIS — R07.9 CHEST PAIN, UNSPECIFIED TYPE: ICD-10-CM

## 2024-07-12 DIAGNOSIS — K80.20 CALCULUS OF GALLBLADDER WITHOUT CHOLECYSTITIS WITHOUT OBSTRUCTION: ICD-10-CM

## 2024-07-12 LAB
ALBUMIN SERPL BCG-MCNC: 4.3 G/DL (ref 3.5–5.2)
ALP SERPL-CCNC: 84 U/L (ref 40–150)
ALT SERPL W P-5'-P-CCNC: 23 U/L (ref 0–50)
ANION GAP SERPL CALCULATED.3IONS-SCNC: 7 MMOL/L (ref 7–15)
AST SERPL W P-5'-P-CCNC: 35 U/L (ref 0–45)
ATRIAL RATE - MUSE: 78 BPM
BASOPHILS # BLD AUTO: 0 10E3/UL (ref 0–0.2)
BASOPHILS NFR BLD AUTO: 0 %
BILIRUB SERPL-MCNC: 0.3 MG/DL
BUN SERPL-MCNC: 27.1 MG/DL (ref 8–23)
CALCIUM SERPL-MCNC: 9.6 MG/DL (ref 8.8–10.2)
CHLORIDE SERPL-SCNC: 101 MMOL/L (ref 98–107)
CREAT SERPL-MCNC: 0.69 MG/DL (ref 0.51–0.95)
DEPRECATED HCO3 PLAS-SCNC: 27 MMOL/L (ref 22–29)
DIASTOLIC BLOOD PRESSURE - MUSE: NORMAL MMHG
EGFRCR SERPLBLD CKD-EPI 2021: >90 ML/MIN/1.73M2
EOSINOPHIL # BLD AUTO: 0.1 10E3/UL (ref 0–0.7)
EOSINOPHIL NFR BLD AUTO: 3 %
ERYTHROCYTE [DISTWIDTH] IN BLOOD BY AUTOMATED COUNT: 13.2 % (ref 10–15)
FLUAV RNA SPEC QL NAA+PROBE: NEGATIVE
FLUBV RNA RESP QL NAA+PROBE: NEGATIVE
GLUCOSE SERPL-MCNC: 103 MG/DL (ref 70–99)
HCT VFR BLD AUTO: 41.7 % (ref 35–47)
HGB BLD-MCNC: 14 G/DL (ref 11.7–15.7)
IMM GRANULOCYTES # BLD: 0 10E3/UL
IMM GRANULOCYTES NFR BLD: 0 %
INTERPRETATION ECG - MUSE: NORMAL
LIPASE SERPL-CCNC: 127 U/L (ref 13–60)
LYMPHOCYTES # BLD AUTO: 1.9 10E3/UL (ref 0.8–5.3)
LYMPHOCYTES NFR BLD AUTO: 39 %
MCH RBC QN AUTO: 31.5 PG (ref 26.5–33)
MCHC RBC AUTO-ENTMCNC: 33.6 G/DL (ref 31.5–36.5)
MCV RBC AUTO: 94 FL (ref 78–100)
MONOCYTES # BLD AUTO: 0.6 10E3/UL (ref 0–1.3)
MONOCYTES NFR BLD AUTO: 12 %
NEUTROPHILS # BLD AUTO: 2.3 10E3/UL (ref 1.6–8.3)
NEUTROPHILS NFR BLD AUTO: 46 %
NRBC # BLD AUTO: 0 10E3/UL
NRBC BLD AUTO-RTO: 0 /100
P AXIS - MUSE: 50 DEGREES
PLAT MORPH BLD: ABNORMAL
PLATELET # BLD AUTO: NORMAL 10*3/UL
POTASSIUM SERPL-SCNC: 4.5 MMOL/L (ref 3.4–5.3)
PR INTERVAL - MUSE: 158 MS
PROT SERPL-MCNC: 7.7 G/DL (ref 6.4–8.3)
QRS DURATION - MUSE: 70 MS
QT - MUSE: 370 MS
QTC - MUSE: 421 MS
R AXIS - MUSE: 60 DEGREES
RBC # BLD AUTO: 4.45 10E6/UL (ref 3.8–5.2)
RBC MORPH BLD: ABNORMAL
RSV RNA SPEC NAA+PROBE: NEGATIVE
SARS-COV-2 RNA RESP QL NAA+PROBE: NEGATIVE
SODIUM SERPL-SCNC: 135 MMOL/L (ref 135–145)
SYSTOLIC BLOOD PRESSURE - MUSE: NORMAL MMHG
T AXIS - MUSE: 56 DEGREES
TROPONIN T SERPL HS-MCNC: <6 NG/L
VENTRICULAR RATE- MUSE: 78 BPM
WBC # BLD AUTO: 5 10E3/UL (ref 4–11)

## 2024-07-12 PROCEDURE — 80053 COMPREHEN METABOLIC PANEL: CPT | Performed by: EMERGENCY MEDICINE

## 2024-07-12 PROCEDURE — 87637 SARSCOV2&INF A&B&RSV AMP PRB: CPT | Performed by: EMERGENCY MEDICINE

## 2024-07-12 PROCEDURE — 93005 ELECTROCARDIOGRAM TRACING: CPT

## 2024-07-12 PROCEDURE — 71046 X-RAY EXAM CHEST 2 VIEWS: CPT

## 2024-07-12 PROCEDURE — 85004 AUTOMATED DIFF WBC COUNT: CPT | Performed by: EMERGENCY MEDICINE

## 2024-07-12 PROCEDURE — 85048 AUTOMATED LEUKOCYTE COUNT: CPT | Performed by: EMERGENCY MEDICINE

## 2024-07-12 PROCEDURE — 250N000013 HC RX MED GY IP 250 OP 250 PS 637: Performed by: EMERGENCY MEDICINE

## 2024-07-12 PROCEDURE — 84484 ASSAY OF TROPONIN QUANT: CPT | Performed by: EMERGENCY MEDICINE

## 2024-07-12 PROCEDURE — 76705 ECHO EXAM OF ABDOMEN: CPT

## 2024-07-12 PROCEDURE — 99285 EMERGENCY DEPT VISIT HI MDM: CPT | Mod: 25

## 2024-07-12 PROCEDURE — 83690 ASSAY OF LIPASE: CPT | Performed by: EMERGENCY MEDICINE

## 2024-07-12 PROCEDURE — 250N000013 HC RX MED GY IP 250 OP 250 PS 637: Performed by: STUDENT IN AN ORGANIZED HEALTH CARE EDUCATION/TRAINING PROGRAM

## 2024-07-12 PROCEDURE — 36415 COLL VENOUS BLD VENIPUNCTURE: CPT | Performed by: EMERGENCY MEDICINE

## 2024-07-12 PROCEDURE — 82040 ASSAY OF SERUM ALBUMIN: CPT | Performed by: EMERGENCY MEDICINE

## 2024-07-12 PROCEDURE — 85041 AUTOMATED RBC COUNT: CPT | Performed by: EMERGENCY MEDICINE

## 2024-07-12 RX ORDER — SUCRALFATE ORAL 1 G/10ML
1 SUSPENSION ORAL ONCE
Status: COMPLETED | OUTPATIENT
Start: 2024-07-12 | End: 2024-07-12

## 2024-07-12 RX ORDER — SUCRALFATE 1 G/1
1 TABLET ORAL 4 TIMES DAILY
Qty: 56 TABLET | Refills: 0 | Status: SHIPPED | OUTPATIENT
Start: 2024-07-12 | End: 2024-07-26

## 2024-07-12 RX ORDER — MAGNESIUM HYDROXIDE/ALUMINUM HYDROXICE/SIMETHICONE 120; 1200; 1200 MG/30ML; MG/30ML; MG/30ML
15 SUSPENSION ORAL ONCE
Status: COMPLETED | OUTPATIENT
Start: 2024-07-12 | End: 2024-07-12

## 2024-07-12 RX ADMIN — SUCRALFATE 1 G: 1 SUSPENSION ORAL at 21:38

## 2024-07-12 RX ADMIN — ALUMINUM HYDROXIDE, MAGNESIUM HYDROXIDE, AND SIMETHICONE 15 ML: 200; 200; 20 SUSPENSION ORAL at 16:33

## 2024-07-12 ASSESSMENT — ACTIVITIES OF DAILY LIVING (ADL)
ADLS_ACUITY_SCORE: 35
ADLS_ACUITY_SCORE: 35
ADLS_ACUITY_SCORE: 33
ADLS_ACUITY_SCORE: 35
ADLS_ACUITY_SCORE: 33
ADLS_ACUITY_SCORE: 35
ADLS_ACUITY_SCORE: 33

## 2024-07-12 ASSESSMENT — COLUMBIA-SUICIDE SEVERITY RATING SCALE - C-SSRS
1. IN THE PAST MONTH, HAVE YOU WISHED YOU WERE DEAD OR WISHED YOU COULD GO TO SLEEP AND NOT WAKE UP?: NO
2. HAVE YOU ACTUALLY HAD ANY THOUGHTS OF KILLING YOURSELF IN THE PAST MONTH?: NO
6. HAVE YOU EVER DONE ANYTHING, STARTED TO DO ANYTHING, OR PREPARED TO DO ANYTHING TO END YOUR LIFE?: NO

## 2024-07-12 NOTE — ED TRIAGE NOTES
Pt has chest pain that started this am, productive cough for years and dx of GERD.  Pt took 2 doses of mylanta wihtout relief this am.     Triage Assessment (Adult)       Row Name 07/12/24 1440          Triage Assessment    Airway WDL WDL        Respiratory WDL    Respiratory WDL WDL        Skin Circulation/Temperature WDL    Skin Circulation/Temperature WDL WDL        Cardiac WDL    Cardiac WDL chest pain        Chest Pain Assessment    Chest Pain Location epigastric        Peripheral/Neurovascular WDL    Peripheral Neurovascular WDL WDL        Cognitive/Neuro/Behavioral WDL    Cognitive/Neuro/Behavioral WDL WDL

## 2024-07-13 NOTE — ED PROVIDER NOTES
"  Emergency Department Note      History of Present Illness     Chief Complaint   Chest Pain    HPI   Breonna Chapman is a 73 year old female with history of GERD who presents for evaluation of chest pain. The patient reports she experienced onset of non radiating chest pain earlier today and is worried about acute cardiac events. Notes that her chest pain today felt similar to her previous GERD pain but also felt slightly different. Adds that she took two doses of Mylanta without relief. States that she has followed with a gastroenterologist and has been through a lengthy workup with PPI and H2 blocker which she has adverse allergic reaction to. Notes that she has tried Omeprazole, Pepcid, and Cimetidine which she has had adverse reaction to. She did not want to try Pantoprazole and has never tried Carafate. She denies alcohol and tobacco use. She denies diaphoresis, nausea, shortness of breath, and black or tarry stool.     Independent Historian   None    Review of External Notes   Emergency department visit from [facility] in [place] for chest pain was reviewed    Past Medical History     Medical History and Problem List   Depression  GERD  Kidney stone  Asthma    Medications   Cimetidine  Famotidine  Trelegy ellipta  Hydroxyzine  Omeprazole  Pantoprazole     Surgical History   Wrist surgery, right     Physical Exam     Patient Vitals for the past 24 hrs:   BP Temp Temp src Pulse Resp SpO2 Height Weight   07/12/24 2210 133/59 97.5  F (36.4  C) Tympanic 65 19 93 % -- --   07/12/24 1440 135/63 -- -- 76 16 99 % -- --   07/12/24 1401 120/65 97.6  F (36.4  C) Oral 85 16 99 % 1.6 m (5' 3\") 65.8 kg (145 lb)     Physical Exam  General: Alert, interactive   Head:  Scalp is atraumatic  Eyes:  The pupils are equal, round, and reactive to light    EOM's intact    No scleral icterus  ENT:      Nose:  The external nose is normal  Ears:  External ears are normal  Mouth/Throat: Mucus membranes are moist       Neck:  Normal range " of motion.      There is no rigidity.    Trachea is in the midline         CV:  Regular rate and rhythm    No murmur   Resp:  Breath sounds are clear bilaterally    Non-labored, no retractions or accessory muscle use      GI:  Abdomen is soft, no distension, no tenderness.       MS:  Normal strength in all 4 extremities  Skin:  Warm and dry, No rash or lesions noted.  Neuro:   Strength 5/5 x4.  Sensation intact  In all 4 extremities.     GCS: 15  Psych: Awake. Alert.  Normal affect.      Appropriate interactions.    Diagnostics     Lab Results   Labs Ordered and Resulted from Time of ED Arrival to Time of ED Departure   COMPREHENSIVE METABOLIC PANEL - Abnormal       Result Value    Sodium 135      Potassium 4.5      Carbon Dioxide (CO2) 27      Anion Gap 7      Urea Nitrogen 27.1 (*)     Creatinine 0.69      GFR Estimate >90      Calcium 9.6      Chloride 101      Glucose 103 (*)     Alkaline Phosphatase 84      AST 35      ALT 23      Protein Total 7.7      Albumin 4.3      Bilirubin Total 0.3     RBC AND PLATELET MORPHOLOGY - Abnormal    RBC Morphology Confirmed RBC Indices      Platelet Assessment Platelets Clumped (*)    LIPASE - Abnormal    Lipase 127 (*)    TROPONIN T, HIGH SENSITIVITY - Normal    Troponin T, High Sensitivity <6     INFLUENZA A/B, RSV, & SARS-COV2 PCR - Normal    Influenza A PCR Negative      Influenza B PCR Negative      RSV PCR Negative      SARS CoV2 PCR Negative     CBC WITH PLATELETS AND DIFFERENTIAL    WBC Count 5.0      RBC Count 4.45      Hemoglobin 14.0      Hematocrit 41.7      MCV 94      MCH 31.5      MCHC 33.6      RDW 13.2      Platelet Count        % Neutrophils 46      % Lymphocytes 39      % Monocytes 12      % Eosinophils 3      % Basophils 0      % Immature Granulocytes 0      NRBCs per 100 WBC 0      Absolute Neutrophils 2.3      Absolute Lymphocytes 1.9      Absolute Monocytes 0.6      Absolute Eosinophils 0.1      Absolute Basophils 0.0      Absolute Immature Granulocytes  0.0      Absolute NRBCs 0.0         Imaging   US Abdomen Limited (RUQ)   Final Result   IMPRESSION:      1.  Predominantly bowel gas obscured pancreas.      2.  Cholelithiasis. No acute cholecystitis or biliary dilatation.         XR Chest 2 Views   Final Result   IMPRESSION: Negative chest.          EKG   ECG taken at 1354, ECG read at 1939  Normal sinus rhythm  Low voltage QRS   No change as compared to prior, dated 6/28/24.  Rate 78 bpm. RI interval 158 ms. QRS duration 70 ms. QT/QTc 370/421 ms. P-R-T axes 50 60 56.    Independent Interpretation   CXR: No pneumothorax or infiltrate.    ED Course      Medications Administered   Medications   alum & mag hydroxide-simethicone (MAALOX) suspension 15 mL (15 mLs Oral $Given 7/12/24 1633)   sucralfate (CARAFATE) suspension 1 g (1 g Oral $Given 7/12/24 2138)       Procedures   Procedures     Discussion of Management   None    ED Course   ED Course as of 07/13/24 0005 Fri Jul 12, 2024 1939 I obtained history and examined the patient as noted above.    2235 I rechecked and updated the patient.        Optional/Additional Documentation  None    Medical Decision Making / Diagnosis     CMS Diagnoses: None    MIPS       None    MDM   Breonna Chapman is a 73 year old female presenting with epigastric and chest discomfort.  A broad differential was considered including acute coronary syndrome, her EKG is nonischemic and troponin is nondetectable.  She has no risk factors for acute coronary syndrome.  Given the recurrent nature of the symptoms I think ACS is unlikely I do not believe she needs any further workup at this time.  She has a longstanding history of gastroesophageal reflux disease is had multiple visits for similar symptoms in the past.  She received Carafate with improvement.  Unfortunately she is allergic to PPIs and H2 blockers.  I provided the phone number for gastroenterology and will prescribe Carafate, I recommended return if new symptoms develop.  Of note her  lipase is mildly elevated, she has no epigastric tenderness, does not drink alcohol.  She does have gallstones noted on ultrasound and certainly a passed gallstone could have caused her symptoms today.  There is no signs of acute cholecystitis and the patient feels well and I believe she requires hospitalization.    Disposition   The patient was discharged.     Diagnosis     ICD-10-CM    1. Chest pain, unspecified type  R07.9       2. Gastroesophageal reflux disease, unspecified whether esophagitis present  K21.9       3. Elevated lipase  R74.8       4. Calculus of gallbladder without cholecystitis without obstruction  K80.20            Discharge Medications   Discharge Medication List as of 7/12/2024 10:33 PM        START taking these medications    Details   sucralfate (CARAFATE) 1 GM tablet Take 1 tablet (1 g) by mouth 4 times daily for 14 days, Disp-56 tablet, R-0, Local Print               Scribe Disclosure:  I, Rossy Box, am serving as a scribe at 10:36 PM on 7/12/2024 to document services personally performed by Mukund Weaver MD based on my observations and the provider's statements to me.        Mukund Weaver MD  07/13/24 0006

## 2025-07-07 PROBLEM — Z00.00 ENCOUNTER FOR PREVENTIVE HEALTH EXAMINATION: Status: ACTIVE | Noted: 2025-07-07

## 2025-07-22 ENCOUNTER — APPOINTMENT (OUTPATIENT)
Dept: INTERNAL MEDICINE | Facility: CLINIC | Age: 75
End: 2025-07-22

## 2025-07-22 ENCOUNTER — APPOINTMENT (OUTPATIENT)
Dept: DERMATOLOGY | Facility: CLINIC | Age: 75
End: 2025-07-22

## 2025-07-23 ENCOUNTER — NON-APPOINTMENT (OUTPATIENT)
Age: 75
End: 2025-07-23

## 2025-07-23 ENCOUNTER — LABORATORY RESULT (OUTPATIENT)
Age: 75
End: 2025-07-23

## 2025-07-23 ENCOUNTER — APPOINTMENT (OUTPATIENT)
Dept: GASTROENTEROLOGY | Facility: CLINIC | Age: 75
End: 2025-07-23
Payer: MEDICARE

## 2025-07-23 VITALS
SYSTOLIC BLOOD PRESSURE: 118 MMHG | HEART RATE: 72 BPM | OXYGEN SATURATION: 96 % | TEMPERATURE: 96.4 F | RESPIRATION RATE: 15 BRPM | WEIGHT: 133 LBS | DIASTOLIC BLOOD PRESSURE: 60 MMHG

## 2025-07-23 DIAGNOSIS — K21.9 GASTRO-ESOPHAGEAL REFLUX DISEASE W/OUT ESOPHAGITIS: ICD-10-CM

## 2025-07-23 PROCEDURE — G2211 COMPLEX E/M VISIT ADD ON: CPT

## 2025-07-23 PROCEDURE — 99205 OFFICE O/P NEW HI 60 MIN: CPT

## 2025-07-23 RX ORDER — FAMOTIDINE 40 MG/1
40 TABLET, FILM COATED ORAL
Qty: 180 | Refills: 0 | Status: ACTIVE | COMMUNITY
Start: 2025-07-23 | End: 1900-01-01

## 2025-07-25 ENCOUNTER — NON-APPOINTMENT (OUTPATIENT)
Age: 75
End: 2025-07-25

## 2025-07-25 DIAGNOSIS — E03.9 HYPOTHYROIDISM, UNSPECIFIED: ICD-10-CM

## 2025-07-25 LAB
ALBUMIN SERPL ELPH-MCNC: 4.3 G/DL
ALP BLD-CCNC: 71 U/L
ALT SERPL-CCNC: 18 U/L
ANION GAP SERPL CALC-SCNC: 13 MMOL/L
AST SERPL-CCNC: 31 U/L
BILIRUB SERPL-MCNC: 0.3 MG/DL
BUN SERPL-MCNC: 21 MG/DL
CALCIUM SERPL-MCNC: 9.3 MG/DL
CHLORIDE SERPL-SCNC: 102 MMOL/L
CO2 SERPL-SCNC: 24 MMOL/L
CREAT SERPL-MCNC: 0.71 MG/DL
EGFRCR SERPLBLD CKD-EPI 2021: 90 ML/MIN/1.73M2
ESTIMATED AVERAGE GLUCOSE: 128 MG/DL
GLUCOSE SERPL-MCNC: 103 MG/DL
HBA1C MFR BLD HPLC: 6.1 %
HCT VFR BLD CALC: 35.9 %
HGB BLD-MCNC: 11.8 G/DL
MCHC RBC-ENTMCNC: 32.4 PG
MCHC RBC-ENTMCNC: 32.9 G/DL
MCV RBC AUTO: 98.6 FL
PLATELET # BLD AUTO: 230 K/UL
POTASSIUM SERPL-SCNC: 4.7 MMOL/L
PROT SERPL-MCNC: 7.1 G/DL
RBC # BLD: 3.64 M/UL
RBC # FLD: 12.5 %
SODIUM SERPL-SCNC: 138 MMOL/L
TSH SERPL-ACNC: 7.3 UIU/ML
WBC # FLD AUTO: 3.91 K/UL

## 2025-08-04 ENCOUNTER — TRANSCRIPTION ENCOUNTER (OUTPATIENT)
Age: 75
End: 2025-08-04

## 2025-08-13 ENCOUNTER — APPOINTMENT (OUTPATIENT)
Dept: INTERNAL MEDICINE | Facility: CLINIC | Age: 75
End: 2025-08-13

## 2025-08-19 ENCOUNTER — APPOINTMENT (OUTPATIENT)
Dept: INTERNAL MEDICINE | Facility: CLINIC | Age: 75
End: 2025-08-19

## 2025-09-10 ENCOUNTER — APPOINTMENT (OUTPATIENT)
Dept: GASTROENTEROLOGY | Facility: HOSPITAL | Age: 75
End: 2025-09-10

## 2025-09-11 ENCOUNTER — APPOINTMENT (OUTPATIENT)
Dept: INTERNAL MEDICINE | Facility: CLINIC | Age: 75
End: 2025-09-11